# Patient Record
Sex: MALE | Race: WHITE | NOT HISPANIC OR LATINO | URBAN - METROPOLITAN AREA
[De-identification: names, ages, dates, MRNs, and addresses within clinical notes are randomized per-mention and may not be internally consistent; named-entity substitution may affect disease eponyms.]

---

## 2020-12-26 ENCOUNTER — EMERGENCY (EMERGENCY)
Facility: HOSPITAL | Age: 35
LOS: 1 days | Discharge: HOME | End: 2020-12-26
Attending: EMERGENCY MEDICINE | Admitting: EMERGENCY MEDICINE
Payer: COMMERCIAL

## 2020-12-26 VITALS
TEMPERATURE: 97 F | DIASTOLIC BLOOD PRESSURE: 82 MMHG | SYSTOLIC BLOOD PRESSURE: 144 MMHG | RESPIRATION RATE: 16 BRPM | OXYGEN SATURATION: 99 % | HEART RATE: 95 BPM

## 2020-12-26 LAB
ALBUMIN SERPL ELPH-MCNC: 4.3 G/DL — SIGNIFICANT CHANGE UP (ref 3.3–5)
ALP SERPL-CCNC: 95 U/L — SIGNIFICANT CHANGE UP (ref 40–120)
ALT FLD-CCNC: 39 U/L — SIGNIFICANT CHANGE UP (ref 4–41)
ANION GAP SERPL CALC-SCNC: 11 MMOL/L — SIGNIFICANT CHANGE UP (ref 7–14)
AST SERPL-CCNC: 26 U/L — SIGNIFICANT CHANGE UP (ref 4–40)
BASOPHILS # BLD AUTO: 0.02 K/UL — SIGNIFICANT CHANGE UP (ref 0–0.2)
BASOPHILS NFR BLD AUTO: 0.1 % — SIGNIFICANT CHANGE UP (ref 0–2)
BILIRUB SERPL-MCNC: 0.6 MG/DL — SIGNIFICANT CHANGE UP (ref 0.2–1.2)
BLOOD GAS VENOUS COMPREHENSIVE RESULT: SIGNIFICANT CHANGE UP
BUN SERPL-MCNC: 17 MG/DL — SIGNIFICANT CHANGE UP (ref 7–23)
CALCIUM SERPL-MCNC: 9.7 MG/DL — SIGNIFICANT CHANGE UP (ref 8.4–10.5)
CHLORIDE SERPL-SCNC: 101 MMOL/L — SIGNIFICANT CHANGE UP (ref 98–107)
CO2 SERPL-SCNC: 26 MMOL/L — SIGNIFICANT CHANGE UP (ref 22–31)
CREAT SERPL-MCNC: 1 MG/DL — SIGNIFICANT CHANGE UP (ref 0.5–1.3)
EOSINOPHIL # BLD AUTO: 0.04 K/UL — SIGNIFICANT CHANGE UP (ref 0–0.5)
EOSINOPHIL NFR BLD AUTO: 0.3 % — SIGNIFICANT CHANGE UP (ref 0–6)
GLUCOSE SERPL-MCNC: 150 MG/DL — HIGH (ref 70–99)
HCT VFR BLD CALC: 42.1 % — SIGNIFICANT CHANGE UP (ref 39–50)
HGB BLD-MCNC: 14.2 G/DL — SIGNIFICANT CHANGE UP (ref 13–17)
IANC: 12.37 K/UL — HIGH (ref 1.5–8.5)
IMM GRANULOCYTES NFR BLD AUTO: 0.5 % — SIGNIFICANT CHANGE UP (ref 0–1.5)
LYMPHOCYTES # BLD AUTO: 1.36 K/UL — SIGNIFICANT CHANGE UP (ref 1–3.3)
LYMPHOCYTES # BLD AUTO: 8.9 % — LOW (ref 13–44)
MCHC RBC-ENTMCNC: 27.8 PG — SIGNIFICANT CHANGE UP (ref 27–34)
MCHC RBC-ENTMCNC: 33.7 GM/DL — SIGNIFICANT CHANGE UP (ref 32–36)
MCV RBC AUTO: 82.5 FL — SIGNIFICANT CHANGE UP (ref 80–100)
MONOCYTES # BLD AUTO: 1.37 K/UL — HIGH (ref 0–0.9)
MONOCYTES NFR BLD AUTO: 9 % — SIGNIFICANT CHANGE UP (ref 2–14)
NEUTROPHILS # BLD AUTO: 12.37 K/UL — HIGH (ref 1.8–7.4)
NEUTROPHILS NFR BLD AUTO: 81.2 % — HIGH (ref 43–77)
NRBC # BLD: 0 /100 WBCS — SIGNIFICANT CHANGE UP
NRBC # FLD: 0 K/UL — SIGNIFICANT CHANGE UP
PLATELET # BLD AUTO: 244 K/UL — SIGNIFICANT CHANGE UP (ref 150–400)
POTASSIUM SERPL-MCNC: 3.6 MMOL/L — SIGNIFICANT CHANGE UP (ref 3.5–5.3)
POTASSIUM SERPL-SCNC: 3.6 MMOL/L — SIGNIFICANT CHANGE UP (ref 3.5–5.3)
PROT SERPL-MCNC: 8.1 G/DL — SIGNIFICANT CHANGE UP (ref 6–8.3)
RBC # BLD: 5.1 M/UL — SIGNIFICANT CHANGE UP (ref 4.2–5.8)
RBC # FLD: 11.9 % — SIGNIFICANT CHANGE UP (ref 10.3–14.5)
SODIUM SERPL-SCNC: 138 MMOL/L — SIGNIFICANT CHANGE UP (ref 135–145)
WBC # BLD: 15.23 K/UL — HIGH (ref 3.8–10.5)
WBC # FLD AUTO: 15.23 K/UL — HIGH (ref 3.8–10.5)

## 2020-12-26 PROCEDURE — 99285 EMERGENCY DEPT VISIT HI MDM: CPT

## 2020-12-26 PROCEDURE — 72193 CT PELVIS W/DYE: CPT | Mod: 26

## 2020-12-26 RX ORDER — KETOROLAC TROMETHAMINE 30 MG/ML
15 SYRINGE (ML) INJECTION ONCE
Refills: 0 | Status: DISCONTINUED | OUTPATIENT
Start: 2020-12-26 | End: 2020-12-26

## 2020-12-26 RX ORDER — AMPICILLIN SODIUM AND SULBACTAM SODIUM 250; 125 MG/ML; MG/ML
3 INJECTION, POWDER, FOR SUSPENSION INTRAMUSCULAR; INTRAVENOUS ONCE
Refills: 0 | Status: COMPLETED | OUTPATIENT
Start: 2020-12-26 | End: 2020-12-26

## 2020-12-26 RX ORDER — MORPHINE SULFATE 50 MG/1
4 CAPSULE, EXTENDED RELEASE ORAL ONCE
Refills: 0 | Status: DISCONTINUED | OUTPATIENT
Start: 2020-12-26 | End: 2020-12-26

## 2020-12-26 RX ADMIN — AMPICILLIN SODIUM AND SULBACTAM SODIUM 200 GRAM(S): 250; 125 INJECTION, POWDER, FOR SUSPENSION INTRAMUSCULAR; INTRAVENOUS at 23:13

## 2020-12-26 RX ADMIN — Medication 15 MILLIGRAM(S): at 22:18

## 2020-12-26 RX ADMIN — MORPHINE SULFATE 4 MILLIGRAM(S): 50 CAPSULE, EXTENDED RELEASE ORAL at 22:18

## 2020-12-26 NOTE — ED PROCEDURE NOTE - PROCEDURE ADDITIONAL DETAILS
Suspected perirectal abscess approx 20 ml in volume with overlying cobblestoning and hypervascularity (5 o'clock/lithotomy) Suspected perianal vs perirectal abscess approx 20 ml in volume with overlying cobblestoning and hypervascularity (5 o'clock/lithotomy)

## 2020-12-26 NOTE — ED PROVIDER NOTE - ATTENDING CONTRIBUTION TO CARE
Attending Attestation: Dr. Rhodes  I have personally performed a history and physical examination of the patient and discussed management with the resident as well as the patient.  I reviewed the resident's note and agree with the documented findings and plan of care.  I have authored and modified critical sections of the Provider Note, including but not limited to HPI, Physical Exam and MDM. 35 yr old M c h/o perianal abscesses and pilonidal cyst presents w. pain and swelling from buttock lateral to left anus around perineum just inferior to left scrotum, indurated w/ underlying fluctuance x5 days, no spontaneous drainage; subjective fever/chills x1 day at home, no prior hx of IBD, palpable tender fullness in rectum and e/o abscess on POCUS. Concern for perianal vs perirectal abscess, ct pelvis w/ iv contrast, cbc cmp vbg, consider US of mass before drainage, consider surgical consult if complicated.

## 2020-12-26 NOTE — ED PROVIDER NOTE - PMH
No pertinent past medical history <<----- Click to add NO pertinent Past Medical History Pilonidal cyst with abscess

## 2020-12-26 NOTE — ED PROVIDER NOTE - CLINICAL SUMMARY MEDICAL DECISION MAKING FREE TEXT BOX
35M presents w. abscess from buttock lateral to left anus around perineum just inferior to left scrotum, indurated w/ underlying fluctuance x5 days, no spontaneous drainage/fluctuance, subjective fever/chills x1 day at home, no prior hx of IBD, palpable mass in rectum. Concern for perianal vs perirectal abscess, ct pelvis w/ iv contrast, cbc cmp vbg, consider US of mass before drainage, consider surgical consult if complicated. 35 yr old M c h/o perianal abscesses and pilonidal cyst presents w. pain and swelling from buttock lateral to left anus around perineum just inferior to left scrotum, indurated w/ underlying fluctuance x5 days, no spontaneous drainage; subjective fever/chills x1 day at home, no prior hx of IBD, palpable tender fullness in rectum and e/o abscess on POCUS. Concern for perianal vs perirectal abscess, ct pelvis w/ iv contrast, cbc cmp vbg, consider US of mass before drainage, consider surgical consult if complicated.

## 2020-12-26 NOTE — ED PROVIDER NOTE - PHYSICAL EXAMINATION
CONSTITUTIONAL: uncomfortalbe appearing, unable to sit due to pain  SKIN: Erythematous, indurated mass started on L buttock 5cm lateral to anus wrapping over perineum just inferior to scrotum ~ 10cm in length 4cm lateral. No purulence, no discharge,   HEAD: NCAT  EYES: EOMI, PERRLA, no scleral icterus, conjunctiva pink  ENT: normal pharynx with no erythema or exudates  NECK: Supple; non tender. Full ROM.  CARD: RRR, no murmurs.  RESP: clear to ausculation b/l. No crackles or wheezing.  ABD: soft, non-tender, non-distended, no rebound or guarding.  EXT: Full ROM, no bony tenderness, no pedal edema, no calf tenderness  NEURO: normal motor. normal sensory.   PSYCH: Cooperative, appropriate.   Rectal exam: good rectal tone, mass palpable over L side of rectum, no internal fluctuance noted

## 2020-12-26 NOTE — ED ADULT NURSE NOTE - OBJECTIVE STATEMENT
Pt is a 35yr old male, A&OX4 and ambulatory, complaining of a perianal cyst/abscess from the L buttock to the scrotum x3-5 days. Pt reports hx of having similar complaint in the past. Pt unable to sit properly or defecate x1 day 2/2 to pain. Admits to subjective fevers (tmax 99.8F), currently orally afebrile. Resp. even and unlabored. Denies rectal bleeding, drainage, CP, SOB, HA, dizziness, abd. pain, N/V, cough, and urinary symptoms. 20g IV placed to the R AC. Labs sent. Meds given as per order. VS as noted. NAD. Pending CT scan. Will continue to monitor.

## 2020-12-26 NOTE — ED ADULT TRIAGE NOTE - CHIEF COMPLAINT QUOTE
Patient c/o cyst to buttocks for few days. Patient was seen at the urgent care and was told it may be infected. Patient reports tmax 99.8F at home and has been taking tylenol and motrin. Denies any past medical history.

## 2020-12-26 NOTE — ED PROCEDURE NOTE - ATTENDING CONTRIBUTION TO CARE
Attending Attestation: Dr. Carmelo HART supervised the resident and personally obtained the images included in the report.

## 2020-12-26 NOTE — ED PROVIDER NOTE - OBJECTIVE STATEMENT
35M pmh pilonidal cyst presents to ED for 5 day L sided swelling extending from buttock to scrotum. Pt believes they also have had a fever over the past day. Pt unable to have BM x1 day ISO rectal pain 35M pmh pilonidal cyst removed last year presents to ED for 5 day L sided swelling extending from buttock to scrotum. Pt believes they also have had a fever over the past day. Pt unable to have BM x1 day ISO rectal pain. Pt states he has had piror smaller self-resolving skin swelling in the area over the past year, but none have been this severe. He went to urgent care today who sent him to the ED for further evaluation. Pt denies spontaneous drainage of abscess, no purulence, just worsening swelling and pain over the past several days. 35M pmh pilonidal cyst removed last year, multiple perianal abscesses vs celluiltis presents to ED for 5 day L sided perianal swelling extending from buttock to scrotum. Pt believes he also have had a fever over the past day. normal po intake but feels ill. Pt unable to have BM x1 day ISO rectal pain. Pt states he has had prior smaller self-resolving skin swelling in the area over the past year, but none have been this severe. He went to urgent care today who sent him to the ED for further evaluation. Pt denies spontaneous drainage of abscess, no purulence, just worsening swelling and pain over the past several days.

## 2020-12-26 NOTE — ED ADULT TRIAGE NOTE - NS ED NURSE DIRECT TO ROOM YN
Progress Notes by Stephanie Chand MD at 05/15/18 05:46 PM     Author:  Stephanie Chand MD Service:  (none) Author Type:  Physician     Filed:  05/19/18 11:05 AM Encounter Date:  5/15/2018 Status:  Signed     :  Stephanie Chand MD (Physician)              Roomed by: Amber Pina CNA 5:46 PM      SUBJECTIVE:   Jeff Villeda is a 17 year old male who is here today for treatment of wart(s)     Other concerns:[IT1.1T] All other systems reviewed are negative[RT1.1M]    Allergies:   Review of patient's allergies indicates no known allergies.     No current outpatient prescriptions on file.        OBJECTIVE:[IT1.1T]   1[RT1.1M] wart(s) noted on the[IT1.1T] left forefinger[RT1.1M]. Size range is[IT1.1T] 1.5[RT1.1M] cm.    Other Findings:[IT1.1T] None[RT1.1M]    ASSESSMENT/PLAN[IT1.1T]  Discussed viral etiology and natural history of warts, Various treatment methods, side effects and failure rates have been discussed., Soak in warm water and remove callous with nail file several times a week and PROCEDURE: 1 wart(s) frozen with liq. nitrogen with a 20sec thaw time[RT1.1M]    The patient will return at 2-4 week intervals for re-treatments as needed.[IT1.1T]     Electronically Signed by:    Stephanie Chand MD , 5/19/2018[RT1.1T]        Revision History        User Key Date/Time User Provider Type Action    > RT1.1 05/19/18 11:05 AM Stephanie Chand MD Physician Sign     IT1.1 05/15/18 05:46 PM Amber Pina CNA Certified Nursing Assistant Sign at close encounter    M - Manual, T - Template            
No

## 2020-12-26 NOTE — ED PROVIDER NOTE - NS ED ROS FT
Constitutional:  (-) fever, (-) chills, (-) lethargy  Eyes:  (-) eye pain (-) visual changes  ENMT: (-) nasal discharge, (-) sore throat. (-) neck pain or stiffness  Cardiac: (-) chest pain (-) palpitations  Respiratory:  (-) cough (-) respiratory distress.   GI:  (-) nausea (-) vomiting (-) diarrhea (-) abdominal pain (+) constipation  :  (-) dysuria (-) frequency (-) burning.  MS:  (-) back pain (-) joint pain.  Neuro:  (-) headache (-) numbness (-) tingling (-) focal weakness  Skin:  (-) rash  Except as documented in the HPI,  all other systems are negative Constitutional:  (-) chills, (-) lethargy  Eyes:  (-) eye pain (-) visual changes  ENMT: (-) nasal discharge, (-) sore throat. (-) neck pain or stiffness  Cardiac: (-) chest pain (-) palpitations  Respiratory:  (-) cough (-) respiratory distress.   GI:  (-) nausea (-) vomiting (-) diarrhea (-) abdominal pain (+) constipation  :  (-) dysuria (-) frequency (-) burning.  MS:  (-) back pain (-) joint pain.  Neuro:  (-) headache (-) numbness (-) tingling (-) focal weakness  Skin:  (-) rash  Except as documented in the HPI,  all other systems are negative

## 2020-12-27 DIAGNOSIS — Z87.2 PERSONAL HISTORY OF DISEASES OF THE SKIN AND SUBCUTANEOUS TISSUE: Chronic | ICD-10-CM

## 2020-12-27 LAB
HCT VFR BLD CALC: 36.6 % — LOW (ref 39–50)
HGB BLD-MCNC: 12.6 G/DL — LOW (ref 13–17)
MCHC RBC-ENTMCNC: 28.4 PG — SIGNIFICANT CHANGE UP (ref 27–34)
MCHC RBC-ENTMCNC: 34.4 GM/DL — SIGNIFICANT CHANGE UP (ref 32–36)
MCV RBC AUTO: 82.4 FL — SIGNIFICANT CHANGE UP (ref 80–100)
NRBC # BLD: 0 /100 WBCS — SIGNIFICANT CHANGE UP
NRBC # FLD: 0 K/UL — SIGNIFICANT CHANGE UP
PLATELET # BLD AUTO: 228 K/UL — SIGNIFICANT CHANGE UP (ref 150–400)
RBC # BLD: 4.44 M/UL — SIGNIFICANT CHANGE UP (ref 4.2–5.8)
RBC # FLD: 12 % — SIGNIFICANT CHANGE UP (ref 10.3–14.5)
SARS-COV-2 RNA SPEC QL NAA+PROBE: SIGNIFICANT CHANGE UP
WBC # BLD: 11.38 K/UL — HIGH (ref 3.8–10.5)
WBC # FLD AUTO: 11.38 K/UL — HIGH (ref 3.8–10.5)

## 2020-12-27 PROCEDURE — 99218: CPT

## 2020-12-27 RX ORDER — AMPICILLIN SODIUM AND SULBACTAM SODIUM 250; 125 MG/ML; MG/ML
3 INJECTION, POWDER, FOR SUSPENSION INTRAMUSCULAR; INTRAVENOUS ONCE
Refills: 0 | Status: COMPLETED | OUTPATIENT
Start: 2020-12-27 | End: 2020-12-27

## 2020-12-27 RX ORDER — KETOROLAC TROMETHAMINE 30 MG/ML
30 SYRINGE (ML) INJECTION ONCE
Refills: 0 | Status: DISCONTINUED | OUTPATIENT
Start: 2020-12-27 | End: 2020-12-27

## 2020-12-27 RX ORDER — AMPICILLIN SODIUM AND SULBACTAM SODIUM 250; 125 MG/ML; MG/ML
3 INJECTION, POWDER, FOR SUSPENSION INTRAMUSCULAR; INTRAVENOUS EVERY 6 HOURS
Refills: 0 | Status: DISCONTINUED | OUTPATIENT
Start: 2020-12-27 | End: 2020-12-30

## 2020-12-27 RX ORDER — MORPHINE SULFATE 50 MG/1
4 CAPSULE, EXTENDED RELEASE ORAL ONCE
Refills: 0 | Status: DISCONTINUED | OUTPATIENT
Start: 2020-12-27 | End: 2020-12-27

## 2020-12-27 RX ORDER — LIDOCAINE HCL 20 MG/ML
20 VIAL (ML) INJECTION ONCE
Refills: 0 | Status: DISCONTINUED | OUTPATIENT
Start: 2020-12-27 | End: 2020-12-30

## 2020-12-27 RX ORDER — IBUPROFEN 200 MG
600 TABLET ORAL ONCE
Refills: 0 | Status: COMPLETED | OUTPATIENT
Start: 2020-12-27 | End: 2020-12-28

## 2020-12-27 RX ORDER — AMPICILLIN SODIUM AND SULBACTAM SODIUM 250; 125 MG/ML; MG/ML
INJECTION, POWDER, FOR SUSPENSION INTRAMUSCULAR; INTRAVENOUS
Refills: 0 | Status: DISCONTINUED | OUTPATIENT
Start: 2020-12-27 | End: 2020-12-30

## 2020-12-27 RX ADMIN — Medication 30 MILLIGRAM(S): at 04:34

## 2020-12-27 RX ADMIN — MORPHINE SULFATE 4 MILLIGRAM(S): 50 CAPSULE, EXTENDED RELEASE ORAL at 02:18

## 2020-12-27 RX ADMIN — AMPICILLIN SODIUM AND SULBACTAM SODIUM 200 GRAM(S): 250; 125 INJECTION, POWDER, FOR SUSPENSION INTRAMUSCULAR; INTRAVENOUS at 13:20

## 2020-12-27 RX ADMIN — Medication 30 MILLIGRAM(S): at 08:32

## 2020-12-27 RX ADMIN — Medication 15 MILLIGRAM(S): at 09:12

## 2020-12-27 RX ADMIN — AMPICILLIN SODIUM AND SULBACTAM SODIUM 200 GRAM(S): 250; 125 INJECTION, POWDER, FOR SUSPENSION INTRAMUSCULAR; INTRAVENOUS at 19:26

## 2020-12-27 RX ADMIN — AMPICILLIN SODIUM AND SULBACTAM SODIUM 200 GRAM(S): 250; 125 INJECTION, POWDER, FOR SUSPENSION INTRAMUSCULAR; INTRAVENOUS at 08:37

## 2020-12-27 NOTE — ED CDU PROVIDER INITIAL DAY NOTE - BOWEL SOUNDS
R buttock with blanching erythema, perianal abscess sp i&d with mild bleeding, +induration to the inner R gluteal fold, no crepitus, no scrotal tenderness, exam performed by Dr. Bloch, chaperoned by FREDY Garcia

## 2020-12-27 NOTE — ED ADULT NURSE REASSESSMENT NOTE - NS ED NURSE REASSESS COMMENT FT1
Pt received awake and alert x 3 with iv to right a/c. Pt vs wnl pt is afebrile co pain 4/10 Motrin  given.  pt given breakfast try awaiting CDU bed.

## 2020-12-27 NOTE — ED CDU PROVIDER INITIAL DAY NOTE - ATTENDING CONTRIBUTION TO CARE
Dr Bloch, ATTENDING MD-  I performed a face to face bedside interview with patient regarding history of present illness, review of symptoms and past medical history. I completed an independent physical exam.  I have discussed patient's plan of care with PA.   Documentation as above in the note.  Patient well appearing NAD Heart sounds s1s2, lungs clear, abd soft nontender, extrem no edema, left perianal area with induration and small amt of bloody d/c. no perineal swelling or tenderness, no scrotal involvement. mild erythem of medial buttick but no tenderness. e

## 2020-12-27 NOTE — CONSULT NOTE ADULT - ASSESSMENT
34y/o pw with perirectal abscess     - s/p drainage with packing in place   - pt instructed to remove packing tomorrow or with first BM   - dispo per ED, CDU for observe   - need 7 course of abx   - f.u with Dr. Lawrence as an outpatient   [address] [place] [address] [address], [place] [address]  Phone: [phone]    Discussed with attending [id number]

## 2020-12-27 NOTE — ED CDU PROVIDER INITIAL DAY NOTE - OBJECTIVE STATEMENT
35M pmh pilonidal cyst removed last year, multiple perianal abscesses vs celluiltis presents to ED for 5 day L sided perianal swelling extending from buttock to scrotum. Pt believes he also have had a fever over the past day. normal po intake but feels ill. Pt unable to have BM x1 day ISO rectal pain. Pt states he has had prior smaller self-resolving skin swelling in the area over the past year, but none have been this severe. He went to urgent care today who sent him to the ED for further evaluation. Pt denies spontaneous drainage of abscess, no purulence, just worsening swelling and pain over the past several days.    CDU FREDY Garcia: Agree with above. Pt is a 35 year old M here w/ a sung-rectal abscess w/ associated R buttock cellulitis, sp abscess I&D, sent to CDU for IV abx, pain control and surgical eval. Pt currently denies pain, states he was able to have a bowel movement w/ less pain this time. Pt removed packing, as instructed by surgery, prior to having a bowel movement. Has been having some bleeding from the area, no purulence. Afebrile.

## 2020-12-27 NOTE — CONSULT NOTE ADULT - SUBJECTIVE AND OBJECTIVE BOX
CC: 35y old Male admitted with a chief complaint of , now perirectal abscess     HPI:   35M pmh pilonidal cyst removed last year, multiple perianal abscesses vs celluiltis presents to ED for 5 day L sided perianal swelling extending from buttock to scrotum. Pt believes he also have had a fever over the past day. normal po intake but feels ill. Pt unable to have BM x1 day due to rectal pain. Pt states he has had prior smaller self-resolving skin swelling in the area over the past year, but none have been this severe. He went to urgent care today who sent him to the ED for further evaluation. Pt denies spontaneous drainage of abscess, no purulence, just worsening swelling and pain over the past several days. Denies any previous colonoscopy or known hx of IBD.     PMHx: Pilonidal cyst with abscess    No pertinent past medical history      PSHx: No significant past surgical history      Medications (inpatient): lidocaine 1% (Preservative-free) Injectable 20 milliLiter(s) Local Injection Once    Medications (PRN):  Allergies: No Known Allergies  (Intolerances: )  Social Hx:   Family Hx:     Physical Exam  T(C): 36.7  HR: 95 (95 - 96)  BP: 122/60 (122/60 - 144/82)  RR: 18 (16 - 18)  SpO2: 100% (99% - 100%)  Tmax: T(C): , Max: 36.9 (12-27-20 @ 01:08)    General: well developed, well nourished, NAD  Neuro: alert and oriented, no focal deficits, moves all extremities spontaneously  HEENT: NCAT, EOMI, anicteric, mucosa moist  Respiratory: airway patent, respirations unlabored  CVS: regular rate and rhythm  Abdomen: soft, nontender, nondistended  Rectal: left gluteal with erythema and induration small area of fluctuance present, TTP   Extremities: no edema, sensation and movement grossly intact  Skin: warm, dry, appropriate color    Labs:                        14.2   15.23 )-----------( 244      ( 26 Dec 2020 22:48 )             42.1       12-26    138  |  101  |  17  ----------------------------<  150<H>  3.6   |  26  |  1.00    Ca    9.7      26 Dec 2020 22:48    TPro  8.1  /  Alb  4.3  /  TBili  0.6  /  DBili  x   /  AST  26  /  ALT  39  /  AlkPhos  95  12-26            Imaging and other studies:  < from: CT Pelvis w/ IV Cont (12.26.20 @ 23:29) >    INTERPRETATION:  CLINICAL INFORMATION: Evaluate left gluteal abscess    COMPARISON: None.    PROCEDURE: CT of the Pelvis was performed with intravenous contrast.  Intravenous contrast: 90 ml Omnipaque 350. 10 ml discarded.  Oral contrast: None.  Sagittal and coronal reformats were performed.    FINDINGS:    BLADDER: Minimally distended.  REPRODUCTIVE ORGANS: Prostate within normal limits.  LYMPH NODES: No pelvic lymphadenopathy.    VISUALIZED PORTIONS:  ABDOMINAL ORGANS: Within normal limits.  BOWEL: Within normal limits. Normal appendix    VESSELS: Within normal limits.  ABDOMINAL WALL: Left gluteal cellulitis with associated rim-enhancing lobulated perirectal abscess measuring 4.9 x 3.0 x 4.6 cm  BONES: Within normal limits.    IMPRESSION:    Left perirectal abscess extending into the gluteal fat inferiorly with associated cellulitis.

## 2020-12-28 VITALS
TEMPERATURE: 98 F | SYSTOLIC BLOOD PRESSURE: 136 MMHG | RESPIRATION RATE: 16 BRPM | OXYGEN SATURATION: 100 % | HEART RATE: 87 BPM | DIASTOLIC BLOOD PRESSURE: 84 MMHG

## 2020-12-28 PROBLEM — L05.01 PILONIDAL CYST WITH ABSCESS: Chronic | Status: ACTIVE | Noted: 2020-12-26

## 2020-12-28 PROCEDURE — 99217: CPT

## 2020-12-28 RX ORDER — AZTREONAM 2 G
1 VIAL (EA) INJECTION
Qty: 14 | Refills: 0
Start: 2020-12-28 | End: 2021-01-03

## 2020-12-28 RX ADMIN — AMPICILLIN SODIUM AND SULBACTAM SODIUM 200 GRAM(S): 250; 125 INJECTION, POWDER, FOR SUSPENSION INTRAMUSCULAR; INTRAVENOUS at 06:10

## 2020-12-28 RX ADMIN — Medication 600 MILLIGRAM(S): at 10:37

## 2020-12-28 RX ADMIN — AMPICILLIN SODIUM AND SULBACTAM SODIUM 200 GRAM(S): 250; 125 INJECTION, POWDER, FOR SUSPENSION INTRAMUSCULAR; INTRAVENOUS at 01:30

## 2020-12-28 NOTE — ED CDU PROVIDER SUBSEQUENT DAY NOTE - ATTENDING CONTRIBUTION TO CARE
agree with PA note    "35 year old M here w/ a sung-rectal abscess w/ associated R buttock cellulitis, sp abscess I&D, sent to CDU for IV abx, pain control and surgical eval. Pt currently denies pain, states he was able to have a bowel movement w/ less pain this time. Pt removed packing, as instructed by surgery, prior to having a bowel movement. Has been having some bleeding from the area, no purulence. Afebrile."    Overnight no fever, feeling much improved    PE: well appearing; VSS; CTAB/L; s1 s2 no m/r/g abd soft/NT/ND rectal: perianal abscess no fluctuance; some firmness    Imp: surgery to see, likely home on abx

## 2020-12-28 NOTE — ED CDU PROVIDER SUBSEQUENT DAY NOTE - HISTORY
35 year old M here w/ a sung-rectal abscess w/ associated R buttock cellulitis, sp abscess I&D, sent to CDU for IV abx, pain control and surgical eval. Pt currently denies pain, states he was able to have a bowel movement w/ less pain this time. Pt removed packing, as instructed by surgery, prior to having a bowel movement. Has been having some bleeding from the area, no purulence. Afebrile.

## 2020-12-28 NOTE — ED CDU PROVIDER DISPOSITION NOTE - NSFOLLOWUPINSTRUCTIONS_ED_ALL_ED_FT
Follow up with your Primary Medical Doctor within 2-3days. If results or reports were given to you, show copies of your reports given to you. Take all of your medications as previously prescribed.    If you have issues obtaining follow up, please call: [phone] to obtain a doctor or specialist who takes your insurance in your area.     Take Motrin 600mg every 8hrs with food for pain  Take Augmentin 1 tablet twice a day for 7 days  Take Bactrim 1 tablet twice a day for 7 days.    Follow with Dr. Lawrence as an outpatient   [address] [place] [address] [address], [place] [address]  Phone: [phone]    Any worsening redness, swelling, streaking (red lines), fever, chills retrun to ER

## 2020-12-28 NOTE — ED CDU PROVIDER DISPOSITION NOTE - PATIENT PORTAL LINK FT
You can access the FollowMyHealth Patient Portal offered by [facility] by registering at the following website: [email or web address]. By joining [facility]’s FollowMyHealth portal, you will also be able to view your health information using other applications (apps) compatible with our system.

## 2020-12-28 NOTE — ED CDU PROVIDER DISPOSITION NOTE - CLINICAL COURSE
35 year old M here w/ a sung-rectal abscess w/ associated buttock cellulitis, sp abscess I&D, sent to CDU for IV abx, pain control and surgical eval. This morning patient reports pain has improved, and less swelling. states he was able to have a bowel movement w/ less pain, and Pt removed packing, as instructed by surgery, prior to having a bowel movement. Has been having some bleeding from the area, no purulence. Afebrile. Area is reddened, indurated with no fluctuance. Surgery evaluated patient this am and cleared for dc. Pt wound culture growing ecoli and few grampositive cocci in pairs and chains, will dc with Augmentin and bactrim for coverage.

## 2020-12-28 NOTE — ED CDU PROVIDER DISPOSITION NOTE - CARE PROVIDER_API CALL
Harvinder Lawrence  COLON/RECTAL SURGERY  [address] [place] [address], [address]  [place] [address]  Phone: [phone]  Fax: [phone]  Follow Up Time:

## 2020-12-28 NOTE — ED CDU PROVIDER DISPOSITION NOTE - ATTENDING CONTRIBUTION TO CARE
agree with PA note    "35 year old M here w/ a sung-rectal abscess w/ associated R buttock cellulitis, sp abscess I&D, sent to CDU for IV abx, pain control and surgical eval. Pt currently denies pain, states he was able to have a bowel movement w/ less pain this time. Pt removed packing, as instructed by surgery, prior to having a bowel movement. Has been having some bleeding from the area, no purulence. Afebrile."    seen by surgery who cleared pt to go home with abx

## 2021-01-07 PROBLEM — Z00.00 ENCOUNTER FOR PREVENTIVE HEALTH EXAMINATION: Status: ACTIVE | Noted: 2021-01-07

## 2021-01-13 ENCOUNTER — APPOINTMENT (OUTPATIENT)
Dept: COLORECTAL SURGERY | Facility: CLINIC | Age: 36
End: 2021-01-13
Payer: COMMERCIAL

## 2021-01-13 VITALS
RESPIRATION RATE: 16 BRPM | HEART RATE: 76 BPM | WEIGHT: 230 LBS | HEIGHT: 70 IN | DIASTOLIC BLOOD PRESSURE: 76 MMHG | BODY MASS INDEX: 32.93 KG/M2 | SYSTOLIC BLOOD PRESSURE: 111 MMHG | OXYGEN SATURATION: 96 % | TEMPERATURE: 97.5 F

## 2021-01-13 DIAGNOSIS — Z87.19 PERSONAL HISTORY OF OTHER DISEASES OF THE DIGESTIVE SYSTEM: ICD-10-CM

## 2021-01-13 DIAGNOSIS — Z80.0 FAMILY HISTORY OF MALIGNANT NEOPLASM OF DIGESTIVE ORGANS: ICD-10-CM

## 2021-01-13 DIAGNOSIS — Z78.9 OTHER SPECIFIED HEALTH STATUS: ICD-10-CM

## 2021-01-13 DIAGNOSIS — Z87.2 PERSONAL HISTORY OF DISEASES OF THE SKIN AND SUBCUTANEOUS TISSUE: ICD-10-CM

## 2021-01-13 DIAGNOSIS — K61.1 RECTAL ABSCESS: ICD-10-CM

## 2021-01-13 PROCEDURE — 99204 OFFICE O/P NEW MOD 45 MIN: CPT

## 2021-01-13 PROCEDURE — 99072 ADDL SUPL MATRL&STAF TM PHE: CPT

## 2021-01-13 NOTE — PHYSICAL EXAM
[Normal Breath Sounds] : Normal breath sounds [Normal Heart Sounds] : normal heart sounds [Normal Rate and Rhythm] : normal rate and rhythm [Alert] : alert [Oriented to Person] : oriented to person [Oriented to Place] : oriented to place [Oriented to Time] : oriented to time [Calm] : calm [de-identified] : soft, +BS, NT/ND [de-identified] : pilonidal wound well healed, Left anterior tiny remaining wound from I&D, NORBERT deferred [de-identified] : well nourished [de-identified] : NC/AT [de-identified] : +ROM/MAGNUS [de-identified] : intact

## 2021-01-13 NOTE — ASSESSMENT
[FreeTextEntry1] : The patient is advised of a 50% chance of a fistula development. For now he will continue to monitor. he is advised that if he has recurrent pain and swelling to come to the office or the ER immediately and then a fistula would be pursued. \par No acute intervention required at this point

## 2021-01-13 NOTE — HISTORY OF PRESENT ILLNESS
[FreeTextEntry1] : Patient is 36 yo male, here s/p perirectal abscess I&D. Patient was in [facility] ER due to perianal pain and swelling several weeks ago, CT scan showed perirectal abscess which was drained. Patient was on abx for a week. He has since improved and has no pain or drainage currently. He has had swelling and pain in the same area previously but did not seek treatment and denies drainage in the past. Hx of pilonidal cyst excision. Denies changes to bowel habits.

## 2022-01-20 NOTE — ED PROVIDER NOTE - NSTIMEPROVIDERCAREINITIATE_GEN_ER
26-Dec-2020 21:28
Fracture    A fracture is a break in one of your bones. This can occur from a variety of injuries, especially traumatic ones. Symptoms include pain, bruising, or swelling. Do not use the injured limb. If a fracture is in one of the bones below your waist, do not put weight on that limb unless instructed to do so by your healthcare provider. Crutches or a cane may have been provided. A splint or cast may have been applied by your health care provider. Make sure to keep it dry and follow up with an orthopedist as instructed.    Non-weight bearing, wear the knee immobilizer.     SEEK IMMEDIATE MEDICAL CARE IF YOU HAVE ANY OF THE FOLLOWING SYMPTOMS: numbness, tingling, increasing pain, or weakness in any part of the injured limb.

## 2022-06-15 ENCOUNTER — APPOINTMENT (OUTPATIENT)
Dept: COLORECTAL SURGERY | Facility: CLINIC | Age: 37
End: 2022-06-15
Payer: COMMERCIAL

## 2022-06-15 DIAGNOSIS — K64.5 PERIANAL VENOUS THROMBOSIS: ICD-10-CM

## 2022-06-15 PROCEDURE — 10080 I&D PILONIDAL CYST SIMPLE: CPT

## 2022-06-15 PROCEDURE — 99213 OFFICE O/P EST LOW 20 MIN: CPT | Mod: 25

## 2022-06-15 NOTE — PHYSICAL EXAM
[Normal Breath Sounds] : Normal breath sounds [Normal Heart Sounds] : normal heart sounds [Normal Rate and Rhythm] : normal rate and rhythm [Alert] : alert [Oriented to Person] : oriented to person [Oriented to Place] : oriented to place [Oriented to Time] : oriented to time [Calm] : calm [de-identified] : soft, +BS, NT/ND [de-identified] : No evidence of recurrent abscess, soft, minimally tender right posterior thrombosed hemorrhoid.  Area of induration and fluctuance to the left of the midline of the gluteal cleft at his previous site of his pilonidal cystectomy.  NORBERT deferred due to pain [de-identified] : well nourished [de-identified] : NC/AT [de-identified] : +ROM/MAGNUS [de-identified] : intact

## 2022-06-15 NOTE — ASSESSMENT
[FreeTextEntry1] : Thrombosed hemorrhoid\par The patient is reassured that this will resolve on its own.  We will give him topical nifedipine to expedite its resolution\par Sitz bath 3 times daily\par \par Pilonidal abscess\par Status post I&D in the office\par Remove packing tonight\par Dry gauze until the area closes\par Will give antibiotics to treat the surrounding induration\par I advised to monitor the area for recurrence.  He has gone 10 years since his surgery until this abscess.  If he has recurrent abscesses in the area then I would recommend excision

## 2022-06-15 NOTE — PROCEDURE
[FreeTextEntry1] : 1% lighter with epi infiltrated for local anesthesia.  An ellipse of skin is excised over the area of fluctuance at the gluteal cleft.  A small amount of murky drainage is expressed.  Hemostasis obtained with Monsel's.  Wound is packed with quarter inch plain packing.  Patient tolerated well

## 2022-06-15 NOTE — HISTORY OF PRESENT ILLNESS
[FreeTextEntry1] : The patient presents with 2 separate issues.  1 is a painful swelling at the anal verge that has been there for about 2 weeks.  He also has a concomitant area of painful swelling over his previous pilonidal incision.  He has no fevers or chills.  There is no drainage from either of them

## 2022-08-10 ENCOUNTER — APPOINTMENT (OUTPATIENT)
Dept: COLORECTAL SURGERY | Facility: CLINIC | Age: 37
End: 2022-08-10

## 2022-08-10 PROCEDURE — 99213 OFFICE O/P EST LOW 20 MIN: CPT

## 2022-08-10 NOTE — ASSESSMENT
[FreeTextEntry1] : The patient is offered surgical excision but he would like to try more conservative management first.  We will do a trial of antibiotics for 1 week.  He is also advised to try to maintain the area hair free as best as he can as there was a copious amount of hair entering the wound.  Hot water soaks 3 times daily\par Follow-up in 1 to 2 weeks\par The patient is advised that if he is not improved by then then I would recommend pilonidal cystectomy.  The risks benefits and alternatives were discussed with him including but not limited to pain, bleeding, infection, wound healing complications.

## 2022-08-10 NOTE — PHYSICAL EXAM
[Normal Breath Sounds] : Normal breath sounds [Normal Heart Sounds] : normal heart sounds [Normal Rate and Rhythm] : normal rate and rhythm [Alert] : alert [Oriented to Person] : oriented to person [Oriented to Place] : oriented to place [Oriented to Time] : oriented to time [Calm] : calm [de-identified] : soft, +BS, NT/ND [de-identified] : Area of induration to the left of the midline of the gluteal cleft, minimal purulent drainage from a pinhole opening.  No drainable abscess [de-identified] : well nourished [de-identified] : NC/AT [de-identified] : +ROM/MAGNUS [de-identified] : intact

## 2022-08-10 NOTE — HISTORY OF PRESENT ILLNESS
[FreeTextEntry1] : The patient reports resolution of his previously thrombosed hemorrhoid.  He does report however continued discomfort and drainage from the pilonidal abscess.  He states that it is not as bad as it was the last time he was here but it just has not resolved completely

## 2022-08-19 ENCOUNTER — OUTPATIENT (OUTPATIENT)
Dept: OUTPATIENT SERVICES | Facility: HOSPITAL | Age: 37
LOS: 1 days | End: 2022-08-19
Payer: COMMERCIAL

## 2022-08-19 VITALS
HEIGHT: 70 IN | TEMPERATURE: 99 F | DIASTOLIC BLOOD PRESSURE: 85 MMHG | OXYGEN SATURATION: 97 % | WEIGHT: 233.91 LBS | RESPIRATION RATE: 16 BRPM | SYSTOLIC BLOOD PRESSURE: 123 MMHG | HEART RATE: 92 BPM

## 2022-08-19 DIAGNOSIS — L05.01 PILONIDAL CYST WITH ABSCESS: ICD-10-CM

## 2022-08-19 DIAGNOSIS — Z01.818 ENCOUNTER FOR OTHER PREPROCEDURAL EXAMINATION: ICD-10-CM

## 2022-08-19 DIAGNOSIS — Z87.2 PERSONAL HISTORY OF DISEASES OF THE SKIN AND SUBCUTANEOUS TISSUE: Chronic | ICD-10-CM

## 2022-08-19 DIAGNOSIS — Z11.52 ENCOUNTER FOR SCREENING FOR COVID-19: ICD-10-CM

## 2022-08-19 LAB
HCT VFR BLD CALC: 44.2 % — SIGNIFICANT CHANGE UP (ref 39–50)
HGB BLD-MCNC: 15.5 G/DL — SIGNIFICANT CHANGE UP (ref 13–17)
MCHC RBC-ENTMCNC: 28.4 PG — SIGNIFICANT CHANGE UP (ref 27–34)
MCHC RBC-ENTMCNC: 35.1 GM/DL — SIGNIFICANT CHANGE UP (ref 32–36)
MCV RBC AUTO: 81.1 FL — SIGNIFICANT CHANGE UP (ref 80–100)
NRBC # BLD: 0 /100 WBCS — SIGNIFICANT CHANGE UP (ref 0–0)
PLATELET # BLD AUTO: 249 K/UL — SIGNIFICANT CHANGE UP (ref 150–400)
RBC # BLD: 5.45 M/UL — SIGNIFICANT CHANGE UP (ref 4.2–5.8)
RBC # FLD: 12 % — SIGNIFICANT CHANGE UP (ref 10.3–14.5)
SARS-COV-2 RNA SPEC QL NAA+PROBE: SIGNIFICANT CHANGE UP
WBC # BLD: 7.92 K/UL — SIGNIFICANT CHANGE UP (ref 3.8–10.5)
WBC # FLD AUTO: 7.92 K/UL — SIGNIFICANT CHANGE UP (ref 3.8–10.5)

## 2022-08-19 PROCEDURE — G0463: CPT

## 2022-08-19 PROCEDURE — U0003: CPT

## 2022-08-19 PROCEDURE — 85027 COMPLETE CBC AUTOMATED: CPT

## 2022-08-19 PROCEDURE — U0005: CPT

## 2022-08-19 PROCEDURE — 36415 COLL VENOUS BLD VENIPUNCTURE: CPT

## 2022-08-19 PROCEDURE — C9803: CPT

## 2022-08-19 RX ORDER — SODIUM CHLORIDE 9 MG/ML
1000 INJECTION, SOLUTION INTRAVENOUS
Refills: 0 | Status: DISCONTINUED | OUTPATIENT
Start: 2022-08-23 | End: 2022-09-06

## 2022-08-19 RX ORDER — SODIUM CHLORIDE 9 MG/ML
3 INJECTION INTRAMUSCULAR; INTRAVENOUS; SUBCUTANEOUS EVERY 8 HOURS
Refills: 0 | Status: DISCONTINUED | OUTPATIENT
Start: 2022-08-23 | End: 2022-09-06

## 2022-08-19 NOTE — H&P PST ADULT - SKIN/BREAST COMMENTS
notice bump on back side x 3-4 months ago, had I&D in office, then flared back up, currently on Amoxil tx,

## 2022-08-19 NOTE — H&P PST ADULT - NSANTHOSAYNRD_GEN_A_CORE
No. MARICHUY screening performed.  STOP BANG Legend: 0-2 = LOW Risk; 3-4 = INTERMEDIATE Risk; 5-8 = HIGH Risk

## 2022-08-19 NOTE — H&P PST ADULT - NSICDXPASTMEDICALHX_GEN_ALL_CORE_FT
PAST MEDICAL HISTORY:  Asthma due to seasonal allergies mild, last inhaler use 3 months ago    Pilonidal cyst with abscess

## 2022-08-19 NOTE — H&P PST ADULT - HISTORY OF PRESENT ILLNESS
36 yr old male with PMH of  36 yr old male with PMH of Pilonidal cyst s/p I&D 2020, s/p excision 2012 (in [place]), otherwise healthy. Pt reports feeling bump on buttocks 3-4 months ago, had I&D, and was re-evaluated a few weeks ago. Pt denies pain or drainage at this time. Pt is on Amoxacillin will finish on 8/21/22. Pt evaluated by Dr. Lawrence for a scheduled Pilonidal cystectomy on 8/23/22. Pt denies recent travel, sick contact, or covid infection.    **Covid swab on 8/19 at [facility]

## 2022-08-22 ENCOUNTER — TRANSCRIPTION ENCOUNTER (OUTPATIENT)
Age: 37
End: 2022-08-22

## 2022-08-23 ENCOUNTER — TRANSCRIPTION ENCOUNTER (OUTPATIENT)
Age: 37
End: 2022-08-23

## 2022-08-23 ENCOUNTER — RESULT REVIEW (OUTPATIENT)
Age: 37
End: 2022-08-23

## 2022-08-23 ENCOUNTER — OUTPATIENT (OUTPATIENT)
Dept: OUTPATIENT SERVICES | Facility: HOSPITAL | Age: 37
LOS: 1 days | End: 2022-08-23
Payer: COMMERCIAL

## 2022-08-23 ENCOUNTER — APPOINTMENT (OUTPATIENT)
Dept: COLORECTAL SURGERY | Facility: HOSPITAL | Age: 37
End: 2022-08-23

## 2022-08-23 VITALS
RESPIRATION RATE: 16 BRPM | OXYGEN SATURATION: 98 % | TEMPERATURE: 97 F | DIASTOLIC BLOOD PRESSURE: 77 MMHG | SYSTOLIC BLOOD PRESSURE: 128 MMHG

## 2022-08-23 VITALS
HEART RATE: 76 BPM | OXYGEN SATURATION: 99 % | TEMPERATURE: 97 F | RESPIRATION RATE: 16 BRPM | SYSTOLIC BLOOD PRESSURE: 136 MMHG | WEIGHT: 233.91 LBS | DIASTOLIC BLOOD PRESSURE: 84 MMHG | HEIGHT: 70 IN

## 2022-08-23 DIAGNOSIS — Z87.2 PERSONAL HISTORY OF DISEASES OF THE SKIN AND SUBCUTANEOUS TISSUE: Chronic | ICD-10-CM

## 2022-08-23 DIAGNOSIS — L05.01 PILONIDAL CYST WITH ABSCESS: ICD-10-CM

## 2022-08-23 PROCEDURE — 88304 TISSUE EXAM BY PATHOLOGIST: CPT

## 2022-08-23 PROCEDURE — 11772 EXC PILONIDAL CYST COMP: CPT

## 2022-08-23 PROCEDURE — 11771 EXC PILONIDAL CYST XTNSV: CPT

## 2022-08-23 PROCEDURE — 88304 TISSUE EXAM BY PATHOLOGIST: CPT | Mod: 26

## 2022-08-23 RX ORDER — LIDOCAINE HCL 20 MG/ML
0.2 VIAL (ML) INJECTION ONCE
Refills: 0 | Status: COMPLETED | OUTPATIENT
Start: 2022-08-23 | End: 2022-08-23

## 2022-08-23 RX ORDER — CHLORHEXIDINE GLUCONATE 213 G/1000ML
1 SOLUTION TOPICAL ONCE
Refills: 0 | Status: COMPLETED | OUTPATIENT
Start: 2022-08-23 | End: 2022-08-23

## 2022-08-23 RX ORDER — ONDANSETRON 8 MG/1
4 TABLET, FILM COATED ORAL ONCE
Refills: 0 | Status: DISCONTINUED | OUTPATIENT
Start: 2022-08-23 | End: 2022-09-06

## 2022-08-23 RX ORDER — OXYCODONE HYDROCHLORIDE 5 MG/1
1 TABLET ORAL
Qty: 16 | Refills: 0
Start: 2022-08-23 | End: 2022-08-26

## 2022-08-23 RX ORDER — APREPITANT 80 MG/1
40 CAPSULE ORAL ONCE
Refills: 0 | Status: COMPLETED | OUTPATIENT
Start: 2022-08-23 | End: 2022-08-23

## 2022-08-23 RX ORDER — FENTANYL CITRATE 50 UG/ML
25 INJECTION INTRAVENOUS
Refills: 0 | Status: DISCONTINUED | OUTPATIENT
Start: 2022-08-23 | End: 2022-08-23

## 2022-08-23 RX ORDER — AMOXICILLIN 250 MG/5ML
1 SUSPENSION, RECONSTITUTED, ORAL (ML) ORAL
Qty: 0 | Refills: 0 | DISCHARGE

## 2022-08-23 RX ADMIN — SODIUM CHLORIDE 100 MILLILITER(S): 9 INJECTION, SOLUTION INTRAVENOUS at 12:50

## 2022-08-23 RX ADMIN — APREPITANT 40 MILLIGRAM(S): 80 CAPSULE ORAL at 13:12

## 2022-08-23 RX ADMIN — SODIUM CHLORIDE 3 MILLILITER(S): 9 INJECTION INTRAMUSCULAR; INTRAVENOUS; SUBCUTANEOUS at 12:51

## 2022-08-23 NOTE — ASU PATIENT PROFILE, ADULT - FALL HARM RISK - UNIVERSAL INTERVENTIONS
Bed in lowest position, wheels locked, appropriate side rails in place/Call bell, personal items and telephone in reach/Instruct patient to call for assistance before getting out of bed or chair/Non-slip footwear when patient is out of bed/[place] to call system/Physically safe environment - no spills, clutter or unnecessary equipment/Purposeful Proactive Rounding/Room/bathroom lighting operational, light cord in reach

## 2022-08-23 NOTE — ASU DISCHARGE PLAN (ADULT/PEDIATRIC) - ASU DC SPECIAL INSTRUCTIONSFT
PAIN CONTROL: Take over the counter medications as directed on bottle for pain. Alternating between Tylenol (acetaminophen) and Motrin (ibuprofen) every 3 hours may help with pain control. Take additional prescribed medications as needed, as directed.    DRESSINGS: You have a dressing in place over your incision. You can remove the dressing in 24 hours. You make place 4x4 gauze over the incision thereafter.     SHOWER: 24-48 hours after surgery, it is OK to shower. Do not take a bath. You may let the water run over the incision, but do not scrub. Pat dry. Do not put lotion on incision.    ACTIVITY: No heavy lifting or straining. Otherwise, you may return to your usual level of physical activity. If you are taking narcotic pain medication (such as Percocet) DO NOT drive a car, operate machinery or make important decisions.    DIET: Return to your usual diet.    NOTIFY YOUR SURGEON IF: You have any bleeding that does not stop, any pus draining from your wound(s), any fever (over 100.4 F) or chills, persistent nausea/vomiting, persistent diarrhea, or if your pain is not controlled on your discharge pain medications.

## 2022-08-23 NOTE — BRIEF OPERATIVE NOTE - OPERATION/FINDINGS
Marking of pilonidal cyst, preparation w/ local anesthetic. Incision of cyst w/ cautery for hemostasis. Deep sutures w/ Vicryl and skin closure with Nylon. 4x4 Gauze w/ dressing over incision.

## 2022-08-23 NOTE — ASU DISCHARGE PLAN (ADULT/PEDIATRIC) - CARE PROVIDER_API CALL
Harvinder Lawrence)  ColonRectal Surgery; Surgery  [address], [address]  [place] [address]  Phone: [phone]  Fax: [phone]  Follow Up Time: 2 weeks

## 2022-08-23 NOTE — ASU DISCHARGE PLAN (ADULT/PEDIATRIC) - NS MD DC FALL RISK RISK
For information on Fall & Injury Prevention, visit: [email or web address] OR  [email or web address] OR  https://www.cdc.gov/steadi/patient.html

## 2022-08-23 NOTE — ASU PATIENT PROFILE, ADULT - PAIN SCALE PREFERRED, PROFILE
Echo 8/19/2022   1. Procedure narrative: Transthoracic echocardiography was performed.     Image quality was suboptimal. The study was technically limited due to     body habitus. Intravenous contrast (Definity 2ml) was administered to     opacify the left ventricle.  2. Left ventricle: The cavity size is normal. Wall thickness is normal.     The ejection fraction was measured by visual estimation. Wall motion is     normal; there are no regional wall motion abnormalities. Doppler     parameters are consistent with abnormal left ventricular relaxation     (grade 1 diastolic dysfunction). The ejection fraction is 60%.  3. Left atrium: The atrium is normal in size.  4. Right ventricle: The cavity size is normal. Wall thickness is normal.     Systolic function is normal.  5. Pericardium, extracardiac: There is no pericardial effusion.  6. Baseline ECG: Normal sinus rhythm.    Lasix drip per cardiology and nephrology   Stable    numerical 0-10

## 2022-08-24 ENCOUNTER — TRANSCRIPTION ENCOUNTER (OUTPATIENT)
Age: 37
End: 2022-08-24

## 2022-08-24 PROBLEM — J45.909 UNSPECIFIED ASTHMA, UNCOMPLICATED: Chronic | Status: ACTIVE | Noted: 2022-08-19

## 2022-08-29 LAB — SURGICAL PATHOLOGY STUDY: SIGNIFICANT CHANGE UP

## 2022-08-31 RX ORDER — OXYCODONE 5 MG/1
5 TABLET ORAL
Qty: 8 | Refills: 0 | Status: ACTIVE | COMMUNITY
Start: 2022-08-31 | End: 1900-01-01

## 2022-09-07 ENCOUNTER — APPOINTMENT (OUTPATIENT)
Dept: COLORECTAL SURGERY | Facility: CLINIC | Age: 37
End: 2022-09-07

## 2022-09-07 VITALS — HEART RATE: 80 BPM | OXYGEN SATURATION: 98 % | SYSTOLIC BLOOD PRESSURE: 121 MMHG | DIASTOLIC BLOOD PRESSURE: 86 MMHG

## 2022-09-07 DIAGNOSIS — L05.01 PILONIDAL CYST WITH ABSCESS: ICD-10-CM

## 2022-09-07 PROCEDURE — 99024 POSTOP FOLLOW-UP VISIT: CPT

## 2022-09-07 RX ORDER — AMOXICILLIN AND CLAVULANATE POTASSIUM 875; 125 MG/1; MG/1
875-125 TABLET, COATED ORAL
Qty: 10 | Refills: 0 | Status: DISCONTINUED | COMMUNITY
Start: 2022-06-15 | End: 2022-09-07

## 2022-09-07 RX ORDER — AMOXICILLIN AND CLAVULANATE POTASSIUM 875; 125 MG/1; MG/1
875-125 TABLET, COATED ORAL
Qty: 14 | Refills: 0 | Status: DISCONTINUED | COMMUNITY
Start: 2022-08-10 | End: 2022-09-07

## 2022-09-07 NOTE — ASSESSMENT
[FreeTextEntry1] : s/p pilonidal cystectomy\par \par The patient is reassured that he is healing well and that small skin separation will continue to heal on its own.  He is told to expect drainage for another 2 weeks.  [place]  He will follow-up here as needed if he has any issues and will follow-up closer to his new home for wound checks as needed

## 2022-09-07 NOTE — HISTORY OF PRESENT ILLNESS
[FreeTextEntry1] : The patient initially had pain and swelling with subsequent drainage of bloody drainage after which she felt better.  He says the pain is improving significantly with time.  He continues to have some bloody and sometimes yellowish drainage from the wound

## 2022-09-07 NOTE — PHYSICAL EXAM
[Normal Breath Sounds] : Normal breath sounds [Normal Heart Sounds] : normal heart sounds [Normal Rate and Rhythm] : normal rate and rhythm [Alert] : alert [Oriented to Person] : oriented to person [Oriented to Place] : oriented to place [Oriented to Time] : oriented to time [Calm] : calm [de-identified] : soft, +BS, NT/ND [de-identified] : Gluteal cleft wound with nylon sutures in place that are removed today.  The inferior 1/3 of the incision has a small amount of skin separation with drainage.  No surrounding cellulitis [de-identified] : well nourished [de-identified] : NC/AT [de-identified] : +ROM/MAGNUS [de-identified] : intact

## 2024-09-06 NOTE — PACU DISCHARGE NOTE - NSPTMEETSDISCHCRITERIADT_GEN_A_CORE
Acetate Template Statement (Will Not Render If Left Blank): The acetate template will be used to fabricate a custom lead on skin shielding device to allow for lead on skin collimation. This type of shielding will best limit beam penumbra and define the field. Bolus Thickness In Cm: .5 Detail Level: Simple Isodose: 90 Pathology: Use Selected EMA Impression 23-Aug-2022 15:30 Energy In Mev: 6 Custom Bolus Type: custom mixed, molded, and shaped Closing Statement (Will Not Render If Left Blank): The patient tolerated the complex electron beam simulation well and will continue on radiotherapy using the modified field. The patient will return for a field verification simulation before radiation is delivered to confirm patient positioning and block fabrication parameters. Position: supine Intro Statement (Will Not Render If Left Blank): A new radiation electron beam field was designed to include the gross lesion (GTV) with additional margin that accounted for both potential subclinical microscopic extension (CTV), as well as for the beam characteristics of superficial electrons (PTV). This field took into account the changes in the volume of the tumor that have occurred during radiation therapy. Care was taken in designing the field near adjacent normal tissue structures. The target volume was drawn on the skin surface with a permanent marker and then the design with reference marks was carefully traced onto an acetate template. Digital photographs were taken.
